# Patient Record
(demographics unavailable — no encounter records)

---

## 2025-03-28 NOTE — REASON FOR VISIT
[Initial Evaluation] : an initial evaluation [FreeTextEntry1] : gallstone pancreatitis, end ileostomy

## 2025-03-28 NOTE — HISTORY OF PRESENT ILLNESS
[de-identified] : 60F w/ hx of gastritis, morbid obesity, asthma, and recent surgery and hospitalization in [facility] and [facility] subsequently presents for evaluation  Jan 18 2025 - Hospitalized at a hospital in [facility] during her trip due to incarcerated R IGH w/ perforated small bowel. Emergency surgery w/ ex-lap, small bowel resection ~220cm from LoT and 70cm from ileocecal valve. Pt became unstable during OR, end ileostomy created, w/ distal small bowel stump sutured to the fascia near the ileostomy opening. inguinal hernia not repaired   Feb 5 2025 - discharged from the hospital in [facility], flew back to [facility], and admitted to [facility] w/ midline superficial wound infection. Admitted under Dr. Payton Floyd. Discharged on Feb 25. 2025  March 3-18 2025 - admitted to [facility] due to gallstone pancreatitis   AVSS Gen: NAD Resp: nonlabored GI: obese, midline wound w/ chronic scarring, no drainages, no obvious signs of infection. R sided end ileostomy fresh/pink w/ surrounding skin maceration.  60F w/ hx of incarcerated RIGH, SBR, end ileostomy - I discussed w/ pt importance of continued ostomy care and wound care w/ VNS. I discussed importance of continued nutritional intake and hydration. - I discsused that surgery to reverse the ostomy +/- cholecystectomy can be offered but at least 6mo after her initial surgery. Meanwhile, she should continue to optimize her nutritional status and continue wound care around the ostomy. - 3mo f/u

## 2025-05-08 NOTE — REVIEW OF SYSTEMS
[As Noted in HPI] : as noted in HPI [Abdominal Pain] : abdominal pain [Vomiting] : vomiting [Negative] : Heme/Lymph [FreeTextEntry7] : nausea

## 2025-05-08 NOTE — ASSESSMENT
[FreeTextEntry1] : Instructed pt to go to the ER immediately for further evaluation/management. Risks/benefits discussed at length with pt. Pt expressed understanding, but declines to go to the ER at this time. Pt reports that she has been hospitalized twice for two separate attacks of pancreatitis, and each time, her sx never fully go away. Pt is requesting an outpatient workup. Instructed pt to stop taking Pantoprazole. Creon prescribed, instructed pt to take 2 with meals and 1 with snacks. Famotidine 40 mg prescribed, instructed pt to take BID. Zofran 8 mg refill sent, instructed pt to take q4-6h prn for n/v. CBC, CMP, and Lipase sent, will f/u with results. Referred pt for CT Abdomen and Pelvis, will f/u with results. ER precautions discussed at length with pt. Instructed pt to go to the ER immediately if abdominal pain or n/v worsens, or if she develops fever, chills, CP, SOB, dizziness, or headache. Pt expressed understanding and agrees with the plan.  Pancreatitis is inflammation of the pancreas. Inflammation is immune system activity that can cause swelling, pain, and changes in how an organ or tissues work.    The pancreas is a long, flat gland that's tucked behind the stomach. The pancreas helps the body digest food and regulates blood sugars.    Pancreatitis can be an acute condition. This means it appears suddenly and generally lasts a short time. Chronic pancreatitis is a long-term condition. The damage to the pancreas can get worse over time.    Acute pancreatitis may improve on its own. More-serious disease requires treatment in a hospital and can cause life-threatening complications. Symptoms Symptoms of pancreatitis may vary. Acute pancreatitis symptoms may include:   Pain in the upper belly. Pain in the upper belly that radiates to the back. Tenderness when touching the belly. Fever. Rapid pulse. Upset stomach. Vomiting. Chronic pancreatitis signs and symptoms include:   Pain in the upper belly. Belly pain that feels worse after eating. Losing weight without trying. Oily, smelly stools. Some people with chronic pancreatitis only develop symptoms after they get complications of the disease.   When to see a doctor Make an appointment with your doctor if you have sudden belly pain or belly pain that doesn't improve. Seek immediate medical help if your pain is so severe that you can't sit still or find a position that makes you more comfortable.  Causes The pancreas has two major roles. It produces insulin, which helps the body manage and use sugars.   The pancreas also produces dietary juices, called enzymes, that help with digestion. The pancreas makes and stores "turned off" versions of the enzymes. After the pancreas sends the enzymes into the small intestine, they are "turned on" and break down proteins in the small intestine.   If the enzymes are turned on too soon, they can start acting like digestive juices inside the pancreas. The action can irritate, damage or destroy cells. This problem, in turn, leads to immune system responses that cause swelling and other events that affect how the pancreas works.   Several conditions can lead to acute pancreatitis, including:   Blockage in the bile duct caused by gallstones. Heavy alcohol use. Certain medicines. High triglyceride levels in the blood. High calcium levels in the blood. Pancreas cancer. Injuries from trauma or surgery. Conditions that can lead to chronic pancreatitis include:   Damage from repeated acute pancreatitis. Heavy alcohol use. Inherited genes linked to pancreatitis. High triglyceride levels in the blood. High calcium levels in the blood. Sometimes, a cause for pancreatitis is never found. This is known as idiopathic pancreatitis.   Risk factors Factors that increase your risk of pancreatitis include:   Excessive alcohol use. Research shows that having four or five drinks a day increases the risk of pancreatitis. Cigarette smoking. Compared with nonsmokers, smokers are on average three times more likely to develop chronic pancreatitis. Quitting smoking can decrease the risk. Obesity. People with a body mass index of 30 or higher are at increased risk of pancreatitis. Diabetes. Having diabetes increases the risk of pancreatitis. Family history of pancreatitis. A number of genes have been linked to chronic pancreatitis. A family history of the disease is linked to an increased risk, especially when combined with other risk factors. Complications Pancreatitis can cause serious complications, including:   Kidney failure. Acute pancreatitis may result in the kidneys not filtering waste from the blood. Artificial filtering, called dialysis, may be needed for short-term or long-term treatment. Breathing problems. Acute pancreatitis can cause changes in how the lungs work, causing the level of oxygen in the blood to fall to dangerously low levels. Infection. Acute pancreatitis can make the pancreas vulnerable to infections. Pancreatic infections are serious and require intensive treatment, such as surgery or other procedures to remove the infected tissue. Pseudocyst. Acute and chronic pancreatitis can cause fluid and debris to collect in a "pocket" in the pancreas, called a pseudocyst. A large pseudocyst that ruptures can cause complications such as internal bleeding and infection. Malnutrition. With both acute and chronic pancreatitis, the pancreas may not produce enough enzymes for the digestive system. This can lead to malnutrition, diarrhea and weight loss. Diabetes. Diabetes can develop when chronic pancreatitis damages cells that produce insulin. Pancreatic cancer. Long-standing inflammation in the pancreas is a risk factor for cancer of the pancreas.  A low acid/reflux diet was discussed in great detail including not smoking, not drinking alcohol, and not consuming foods that irritate the esophagus. It is helpful to eat small meals throughout the day instead of large meals. You should avoid eating before bedtime or lying down after you eat. It can be helpful to raise the head of your bed six inches. Additionally, you should maintain a healthy weight and good posture. The patient was given written material to take home and review.  I spent 45 minutes with the patient as well as reviewing documents prior to and after the office visit. Patient verbalized understanding of all information provided. All questions answered and reviewed.  Robert Brunner, MD

## 2025-05-08 NOTE — HISTORY OF PRESENT ILLNESS
[FreeTextEntry1] : Thank you for consult.  Patient is a 61 y/o female with a PMHx of Obesity, hypertension, GERD (currently on Pantoprazole 40 mg once daily) hypercalcemia, primary hyperparathyroidism, gallstone pancreatitis, abdominal hernia c/b SBO c/b bowel ischemia and perforation (s/p ex lap, small bowel resection, and diverting loop ileostomy 1/17/2025) c/b midline incision dehiscence who presents for a hospital f/u s/p being hospitalized at [facility] from 4/9/2025 to 4/11/2025 for epigastric abdominal pain and n/v. CT Abdomen and Pelvis performed in the hospital showed "questionable mild inflammation surrounding the pancreatic head. Recommend correlation for pancreatitis" and NAFLD. Lipase performed on 4/9/2025 was elevated at 186. MRCP performed in the hospital on 4/11/2025 showed "no choledocholithiasis or biliary dilation, Cholelithiasis without evidence of acute cholecystitis. Hepatomegaly with moderate-severe steatosis, and increased diffuse interstitial pancreatitis". Pt was treated with fluid and pain medication. The etiology of the pancreatitis is unclear, Ddx gallstone pancreatitis vs. pancreatitis due to hypercalcemia from her primary hyperparathyroidism. Pt was also c/o wound dehiscence at her prior abdominal surgical site, and was instructed to f/u with his surgeon, Dr. Noah bazan, but pt has not done so yet. Pt is currently c/o n/v and severe epigastric abdominal pain ever since being discharged from the hospital, and that she is unable to keep food down. Pt reports that she is having nausea at the present time. Pt reports that she has been taking Pantoprazole 40 mg once daily for the past 30 years for GERD, which has been providing no relief of her current sx. Pt is also on Zofran 8 mg disintegrating tablet, which has provided mild relief of the nausea. Pt is refusing to go to the ER at this time.

## 2025-05-08 NOTE — PHYSICAL EXAM
[Alert] : alert [Normal Voice/Communication] : normal voice/communication [Healthy Appearing] : healthy appearing [No Acute Distress] : no acute distress [Sclera] : the sclera and conjunctiva were normal [Hearing Threshold Finger Rub Not [place]] : hearing was normal [Normal Lips/Gums] : the lips and gums were normal [Oropharynx] : the oropharynx was normal [Normal Appearance] : the appearance of the neck was normal [No Neck Mass] : no neck mass was observed [No Respiratory Distress] : no respiratory distress [No Acc Muscle Use] : no accessory muscle use [Respiration, Rhythm And Depth] : normal respiratory rhythm and effort [Auscultation Breath Sounds / Voice Sounds] : lungs were clear to auscultation bilaterally [Heart Rate And Rhythm] : heart rate was normal and rhythm regular [Normal S1, S2] : normal S1 and S2 [Murmurs] : no murmurs [Bowel Sounds] : normal bowel sounds [No Masses] : no abdominal mass palpated [Abdomen Soft] : soft [] : no hepatosplenomegaly [Epigastric] : epigastric [Oriented To Time, Place, And Person] : oriented to person, place, and time [Rebound Tenderness] : no rebound tenderness [Rebound] : no rebound [Guarding] : no guarding [de-identified] : epigastric tenderness, no guarding or rebound

## 2025-05-20 NOTE — REASON FOR VISIT
[Follow-Up: _____] : a [unfilled] follow-up visit [FreeTextEntry1] : Differential diagnosis, work up ,management and evaluation of above mentioned concerns extensively reviewed Extensive review of above mentioned concerns.  All questions and concerns addressed, patient expressed understanding. Encouraged to contact the office with any questions or concerns.

## 2025-05-20 NOTE — HISTORY OF PRESENT ILLNESS
[de-identified] : 60F w/ hx of gastritis, morbid obesity, asthma, and recent surgery and hospitalization in [facility] and [facility] subsequently presents for evaluation  Jan 18 2025 - Hospitalized at a hospital in [facility] during her trip due to incarcerated R IGH w/ perforated small bowel. Emergency surgery w/ ex-lap, small bowel resection ~220cm from LoT and 70cm from ileocecal valve. Pt became unstable during OR, end ileostomy created, w/ distal small bowel stump sutured to the fascia near the ileostomy opening. inguinal hernia not repaired   Feb 5 2025 - discharged from the hospital in [facility], flew back to [facility], and admitted to [facility] w/ midline superficial wound infection. Admitted under Dr. Payton Floyd. Discharged on Feb 25. 2025  March 3-18 2025 - admitted to [facility] due to gallstone pancreatitis   AVSS Gen: NAD Resp: nonlabored GI: obese, midline wound w/ chronic scarring, no drainages, no obvious signs of infection. R sided end ileostomy fresh/pink w/ surrounding skin maceration.  60F w/ hx of incarcerated RIGH, SBR, end ileostomy - I discussed w/ pt importance of continued ostomy care and wound care w/ VNS. I discussed importance of continued nutritional intake and hydration. - I discsused that surgery to reverse the ostomy +/- cholecystectomy can be offered but at least 6mo after her initial surgery. Meanwhile, she should continue to optimize her nutritional status and continue wound care around the ostomy. - 3mo f/u  [de-identified] : Pt returns today for follow up. Since last office visit. she's been admitted to [facility] twice due to pancreatitis symptoms. Pt was seen by Dr. Brunner (GI)  CT 4/9 - mild pancreatitis w/ prominent pancreatic head CT 5/10 - decreased size of pancreatic head, resolved pancreatic inflammation  60F w/ hx of incarcerated RIGH, SBR, end ileostomy, in addition to improving gallstone pancreatitis - discussed in detail that she currently has 3 medical issues: gallstone pancreatitis, end ileostomy, and R inguinal hernia that has not been repaired (according to the operative dication from the surgeon in [facility]). I discussed the treatment for each problem, and discussed that reversal of ostomy can be considered after at least 6~8 mo postop, and after ensuring she's nutritionally optimized. Cholecystectomy and inguinal hernia repair could potentially be considered concurrently.  - discussed 3 mo follow up, and possible surgical planning then for reversal of ostomy + cholecystectomy +/- inguinal hernia repair - meanwhile, should she develop any more episodes of pancreatitis, discussed the importance of going to the ED to be evaluated. - Refills for Tylenol liquid, Zofran, and Gabapentin provided. PO Dilaudid script pending

## 2025-05-27 NOTE — HISTORY OF PRESENT ILLNESS
[de-identified] : 60F w/ hx of gastritis, morbid obesity, asthma, and recent surgery and hospitalization in [facility] and [facility] subsequently presents for evaluation  Jan 18 2025 - Hospitalized at a hospital in [facility] during her trip due to incarcerated R IGH w/ perforated small bowel. Emergency surgery w/ ex-lap, small bowel resection ~220cm from LoT and 70cm from ileocecal valve. Pt became unstable during OR, end ileostomy created, w/ distal small bowel stump sutured to the fascia near the ileostomy opening. inguinal hernia not repaired   Feb 5 2025 - discharged from the hospital in [facility], flew back to [facility], and admitted to [facility] w/ midline superficial wound infection. Admitted under Dr. Payton Floyd. Discharged on Feb 25. 2025  March 3-18 2025 - admitted to [facility] due to gallstone pancreatitis   [de-identified] : Pt returns for follow up. No issues ,no more pain episodes. Eating well, ostomy still bothersome.   AVSS Gen: NAD Resp: nonlabored GI: obese, midline wound w/ chronic scarring, no drainages, no obvious signs of infection. R sided end ileostomy fresh/pink w/ surrounding skin maceration.  60F w/ hx of incarcerated RIGH, SBR, end ileostomy - I discussed w/ pt importance of continued ostomy care and wound care w/ VNS. I discussed importance of continued nutritional intake and hydration. - I discussed that cholecystectomy +/- reversal of ostomy can be offered, possibly in July. Will look for OR date - CT to be obtained end of June - Pt will need to be medically optimized from her PCP - follow up end of June final re-eval prior to OR planning

## 2025-06-27 NOTE — HISTORY OF PRESENT ILLNESS
[de-identified] : 60F w/ hx of gastritis, morbid obesity, asthma, and recent surgery and hospitalization in [facility] and [facility] subsequently presents for evaluation  Jan 18 2025 - Hospitalized at a hospital in [facility] during her trip due to incarcerated R IGH w/ perforated small bowel. Emergency surgery w/ ex-lap, small bowel resection ~220cm from LoT and 70cm from ileocecal valve. Pt became unstable during OR, end ileostomy created, w/ distal small bowel stump sutured to the fascia near the ileostomy opening. inguinal hernia not repaired   Feb 5 2025 - discharged from the hospital in [facility], flew back to [facility], and admitted to [facility] w/ midline superficial wound infection. Admitted under Dr. Payton Floyd. Discharged on Feb 25. 2025  March 3-18 2025 - admitted to [facility] due to gallstone pancreatitis   [de-identified] : Since the last office visit, pt was at [facility] once 2 weeks ago due to epigastric pain, CT obtained at he time showing recurrent mild pancreatitis. Admission was recommended but she left the hospital AMA. Bcx obtained in ED showed possible MRSA, so she was instructed to return to ED the following day, repeat bcx x 2 obtained which were negative. No further tx done. Pt has been doing okay since then, continuing to have mild epigastric pain, too worried to continue adequate PO intake. Ostomy continues to cause inconvenience w/ leakage and irritation around the skin. Pt continues to take 2mg Dilaudid PRN prescribed from [facility]. Repeat outpt CT from 4 days ago showed resolved pancreatitis, R sided ileostomy w/ parastomal hernia, midline incisional hernia containing small bowel   AVSS Gen: NAD Resp: nonlabored GI: obese, soft, NT, ND, no guarding, midline laparotomy scar, R sided ileostomy w/ mucus fistula  60F w/ hx of incarcerated RIGH, SBR, end ileostomy - Discussed risks, benefits, and alternatives to robot assisted laparoscopic cholecystectomy, reversal of ileostomy, possible bowel resection, possible ostomy, possible open. Answered all relevant questions, pt and tracieyasmeener would like to proceed w/ surgical planning - OR date tentatively on 7/30 - Will need medical optimization from her PCP, cardiac risk stratification - encouraged adequate nutrition intake until OR date, including enough calories and hydration

## 2025-07-17 NOTE — HISTORY OF PRESENT ILLNESS
[Asthma] : asthma [(Patient denies any chest pain, claudication, dyspnea on exertion, orthopnea, palpitations or syncope)] : Patient denies any chest pain, claudication, dyspnea on exertion, orthopnea, palpitations or syncope [Moderate (4-6 METs)] : Moderate (4-6 METs) [Aortic Stenosis] : no aortic stenosis [Atrial Fibrillation] : no atrial fibrillation [Coronary Artery Disease] : no coronary artery disease [Recent Myocardial Infarction] : no recent myocardial infarction [Implantable Device/Pacemaker] : no implantable device/pacemaker [COPD] : no COPD [Sleep Apnea] : no sleep apnea [Smoker] : not a smoker [Family Member] : no family member with adverse anesthesia reaction/sudden death [Self] : no previous adverse anesthesia reaction [Chronic Anticoagulation] : no chronic anticoagulation [Chronic Kidney Disease] : no chronic kidney disease [Diabetes] : no diabetes [FreeTextEntry1] : Robotic assisted laparoscopic cholecystectomy, reversal of ileostomy. possible open, poss: ostomy, poss: destinee resection. [FreeTextEntry2] : 7/30/25 [FreeTextEntry4] : 59y/o female with PMH gallstones, pancreatitis, Hepatic steatosis, Parathyroid Adenoma, Asthma, Obesity, GERD, bowel ischemia s/p perforation with ileostomy bag (1/2025 done in [facility]). Pt seen by DR Zamora, scheduled for Robotic assisted laparoscopic cholecystectomy, reversal of ileostomy. possible open, poss: ostomy, poss: destinee resection.  Recently hospitalized for pancreatitis, RAVINDER, metabolic acidosis. Patient presented after fall, increasing lethargy, CP. Found to have acute pancreatitis.   Today patient denies any acute complaints, but appears tired and fatigued. Denies any fevers, chills or other acute complaints.  [FreeTextEntry6] : Fell recently, patient and daughter deny syncope/LOC.

## 2025-07-17 NOTE — ASSESSMENT
[Patient NOT optimized for Surgery at this time] : Patient not optimized for surgery at this time [FreeTextEntry4] : 61y/o female with PMH gallstones, pancreatitis, Hepatic steatosis, Parathyroid Adenoma, Asthma, Obesity, GERD, bowel ischemia s/p perforation with ileostomy bag (1/2025 done in [facility]) is coming in for [facility] for Robotic assisted laparoscopic cholecystectomy, reversal of ileostomy. possible open, poss: ostomy, poss: destinee resection.  #Preop exam -CMP obtained given RAVINDER on labs in PST - called patient to discuss. Patient in acute kidney failure, hyperkalemic, hypercalcemic, low bicarb, elevated anion gap. Cr is nearly 9, was below 2 at PST. Discussed with patient and recommend patient go to hospital immediately, needs admission for workup of acute renal failure. Patient verbalized understanding. Called daughter as well x2, no response. Left VM with results and plan, asked for call back.

## 2025-07-17 NOTE — PHYSICAL EXAM
[No Acute Distress] : no acute distress [Normal Voice/Communication] : normal voice/communication [No Respiratory Distress] : no respiratory distress  [No Accessory Muscle Use] : no accessory muscle use [Clear to Auscultation] : lungs were clear to auscultation bilaterally [Regular Rhythm] : with a regular rhythm [No Edema] : there was no peripheral edema [Soft] : abdomen soft [Non Tender] : non-tender [Non-distended] : non-distended [No Focal Deficits] : no focal deficits [Speech Grossly Normal] : speech grossly normal [Normal Mood] : the mood was normal [de-identified] : appears tired [de-identified] : tachycardic  [de-identified] : R sided ileostomy in place

## 2025-07-17 NOTE — HISTORY OF PRESENT ILLNESS
[Asthma] : asthma [(Patient denies any chest pain, claudication, dyspnea on exertion, orthopnea, palpitations or syncope)] : Patient denies any chest pain, claudication, dyspnea on exertion, orthopnea, palpitations or syncope [Moderate (4-6 METs)] : Moderate (4-6 METs) [Aortic Stenosis] : no aortic stenosis [Atrial Fibrillation] : no atrial fibrillation [Coronary Artery Disease] : no coronary artery disease [Recent Myocardial Infarction] : no recent myocardial infarction [Implantable Device/Pacemaker] : no implantable device/pacemaker [COPD] : no COPD [Sleep Apnea] : no sleep apnea [Smoker] : not a smoker [Family Member] : no family member with adverse anesthesia reaction/sudden death [Self] : no previous adverse anesthesia reaction [Chronic Anticoagulation] : no chronic anticoagulation [Chronic Kidney Disease] : no chronic kidney disease [Diabetes] : no diabetes [FreeTextEntry1] : Robotic assisted laparoscopic cholecystectomy, reversal of ileostomy. possible open, poss: ostomy, poss: destinee resection. [FreeTextEntry2] : 7/30/25 [FreeTextEntry4] : 61y/o female with PMH gallstones, pancreatitis, Hepatic steatosis, Parathyroid Adenoma, Asthma, Obesity, GERD, bowel ischemia s/p perforation with ileostomy bag (1/2025 done in [facility]). Pt seen by DR Zamora, scheduled for Robotic assisted laparoscopic cholecystectomy, reversal of ileostomy. possible open, poss: ostomy, poss: destinee resection.  Recently hospitalized for pancreatitis, RAVINDER, metabolic acidosis. Patient presented after fall, increasing lethargy, CP. Found to have acute pancreatitis.   Today patient denies any acute complaints, but appears tired and fatigued. Denies any fevers, chills or other acute complaints.  [FreeTextEntry6] : Fell recently, patient and daughter deny syncope/LOC.

## 2025-07-17 NOTE — PHYSICAL EXAM
[No Acute Distress] : no acute distress [Normal Voice/Communication] : normal voice/communication [No Respiratory Distress] : no respiratory distress  [No Accessory Muscle Use] : no accessory muscle use [Clear to Auscultation] : lungs were clear to auscultation bilaterally [Regular Rhythm] : with a regular rhythm [No Edema] : there was no peripheral edema [Soft] : abdomen soft [Non Tender] : non-tender [Non-distended] : non-distended [No Focal Deficits] : no focal deficits [Speech Grossly Normal] : speech grossly normal [Normal Mood] : the mood was normal [de-identified] : appears tired [de-identified] : tachycardic  [de-identified] : R sided ileostomy in place

## 2025-07-17 NOTE — ASSESSMENT
[Patient NOT optimized for Surgery at this time] : Patient not optimized for surgery at this time [FreeTextEntry4] : 61y/o female with PMH gallstones, pancreatitis, Hepatic steatosis, Parathyroid Adenoma, Asthma, Obesity, GERD, bowel ischemia s/p perforation with ileostomy bag (1/2025 done in [facility]) is coming in for [facility] for Robotic assisted laparoscopic cholecystectomy, reversal of ileostomy. possible open, poss: ostomy, poss: dsetinee resection.  #Preop exam -CMP obtained given RAVINDER on labs in PST - called patient to discuss. Patient in acute kidney failure, hyperkalemic, hypercalcemic, low bicarb, elevated anion gap. Cr is nearly 9, was below 2 at PST. Discussed with patient and recommend patient go to hospital immediately, needs admission for workup of acute renal failure. Patient verbalized understanding. Called daughter as well x2, no response. Left VM with results and plan, asked for call back.

## 2025-07-21 NOTE — HISTORY OF PRESENT ILLNESS
[FreeTextEntry1] : 61 yo lady with PMHx HTN, HLD, incarcerated right inguinal hernia with SBR and end ileostomy  07/21/25 ROS + dyspnea on exertion. METS right above 4.

## 2025-07-21 NOTE — DISCUSSION/SUMMARY
[FreeTextEntry1] : 61 yo lady with PMHx HTN, HLD, incarcerated right inguinal hernia with SBR and end ileostomy  Assessment: 1. Pre op cardiac risk stratification Pending robot assisted laparoscopic cholecystectomy, reversal of ileostomy, possible bowel resection, possible ostomy, possible open 2. HTN, HLD 3. Dyspnea on exertion   Plan: 1. EKG showed sinus tachycardia, LAD 2. Given dyspnea and abnormal EKG will need TTE preoperatively. Will try to obtain TTE this week as her surgery is 7/30.   During non face-to-face time, I reviewed relevant portions of the patients medical record. During face-to-face time, I took a relevant history and examined the patient. I also explained differential diagnoses, relevant cardiac diagnoses, workup, and management plan, which required a moderate level of medical decision making. I answered all questions related to the patient's medical conditions.   Luanne RIVAS (John)  of Cardiology [facility] [employer] [facility] at [facility]         [EKG obtained to assist in diagnosis and management of assessed problem(s)] : EKG obtained to assist in diagnosis and management of assessed problem(s)